# Patient Record
(demographics unavailable — no encounter records)

---

## 2024-10-14 NOTE — ASSESSMENT
[FreeTextEntry1] : Ms. BRAND is 44y F with medically refractory epilepsy previously treated on multiple first line AED, now on levetiracetam and lamotrigine and endorsing improved irritability and depression since stopping oxcarbazepine and lowering levetiracetam dose.  Ms. BRAND feels that seizure control has been significantly improved on ER formulations. Now s/p RNS implantation with bilateral mesial temporal leads.    RNS R lead problem d/w [employer] who recommend zeroing out/turning off R lead stim to preserve battery. L lead stim increased from 1.2 mA->1.7, no change to detection set.   Plan 1. RTC 1/13/25 for next RNS Clinic 2. hold Xcopri for now - Ms. BRAND may be willing to re-try, but will re-address at next visit.  3. RNS parameters reviewed with [employer] tech and RNS reprogrammed to increase L stim 1.5 mA. R stim is off.  4. Will let Dr Kruger know of RNS lead break and need to replace electrode.   I have spent 40  minutes or longer reviewing patient data or discussing with the patient  the cause of seizures or seizure-like events and comorbid conditions, assessing the risk of recurrence, educating the patient or family to recognize seizures, discussing possible treatment options for seizures and comorbid conditions and documenting encounter and plan. More than 50% of time spent counseling and educating patient about epilepsy including safety issues, AED side effects and interactions, alcohol consumption, sleep deprivation, risks and driving privileges associated with the [facility] [place]. Greater than 50% of the encounter time was spent on counseling and coordination of care for reviewing records in [employer], discussion with patient regarding plan.   ******* PROCEDURE: Analysis and Programming of [employer] RNS System SERVICE DATE: 10/14/2024   INTERROGATION:  Battery voltage is NOMINAL. Lead impedances are ABNORMAL on right, nominal on left.  Interval data recorded since last upload was transferred to [employer] cloud.   PROGRAMMING CHANGES Based on review of ECOG data, stimulation intensity was INCREASED to 1.7 ma on R leads and DECREASED to zero on L leads. Based on review of ECOG data, seizure detection parameters were NOT updated. Several test stimulations with these parameters were applied manually and were well tolerated.  Device interrogation was repeated at the end of the procedure to verify proper programming of all detections and stimulation settings.   IMPRESSION/PLAN/FOLLOW-UP Patient tolerated programmed changes well.   We will plan for next device interrogation in about 3 months. Will ask Dr. Kruger to evaluate probable lead break on R electrode.   TIME 30 minutes were spent on planning, reviewing, implementing and validating device programming changes.

## 2024-10-14 NOTE — HISTORY OF PRESENT ILLNESS
[FreeTextEntry1] : PCP - Dr. Abner Rodriguez -- [phone]; [address] [place] [address]; [place] [id number]  *** 10/14/2024  ***  Ms. BRAND reports that she is having 2-3 seizures per month - better than before RNS - she estimates that frequency is decreased by 75%.  Ms. BRAND briefly tried cenobamate - but over course of 2 weeks she felt menses were heavier than usual and dc'd it.  She is willing to revise RNS lead on R that is not working and generating high impedances.  Review of data indicate that she may also benefit from increasing stim intensity on left lead.   *** 2024  ***  Ms. BRAND is having 3-4 seizures per month. Review of PDMS shows 3 sz/mo from left, and 1 sz/mo from right (but leads on R are only working intermittently)  Ms. BRAND also c/o bump on left that is uncomfortable. She is apprehensive about trying cenobamate and did not start at last visit.  She has had difficulty getting f/u appointments with me.   *** 2023  *** Ms. BRAND reports baseline seizure rate.  1-2 per month.  Right RNS lead is not working she is reluctant to have it replaced.  She is receptive to trying cenobamate.   *** 2022  *** Ms. BRAND returns for f/u - she came 1 day before RNS clinic due to concerns for traffic at time of appt she was given.  Interrogation of RNS device shows following seizure history:  seizures - usually start L -> spread R - occurring , , , , 8/, 8, , 9/24x2, 10/17.  Seizure on 10/17 was stronger - led to loss awareness.  With most seizures, awareness is partially preserved. Prior to RNS seizures were 5-6 x per month, and more often GTC.   RNS interrogation shows that R electrode not recording ECOG and device is giving high impedance error.   *** 2022  *** Ms. BRAND has experience 1 seizure/month in Aug, Sep, Oct, 2 seizures in Nov, 3 in Dec, 1 in , 1 in Feb.  All seizures have been complex partial - no convulsion for > 1yr. Ms. BRAND endorses that she has been inconsistent uploading data from RNS due to hectic home life with son applying to college.  She briefly started Xcopri last summer, but discontinued out of concern it was causing hypertension.  No new problems. PDMS data reviewed.  ***UPDATE:2021*** MS Opal rBand is here today for a scheduled follow up office visit and is accompanied by her . May 26th had a seizure , No seizures in  , Three  seizures on , two seizures on  , and one event on .  Review of PDMS shows that onset was not captured for seizure of .  In July, seizures on  and  all began and finished on R.  On , seizure began on right, which triggered secondary seizure on L.  Detection did not occur for L seizure.  Prior events showed L side independent onset in Feb, with late detection.   Patient admitted to having some stress related to husbands upcoming surgery.  Ms. BRAND's spouse endorsed that she has increased irritabilty/mood swings.    Review of PDMS recordingds who that there is increased interictal activity overnight until between 10p and 7 am, with smaller relative increase in the mid-afternoon. No magnet activations noted  Collected ECOG samples today  Amitryptilline 25 mg daily caused major fatigue so she stopped Still uses OTC i.e Tylenol  Lamotrigine ER 200mg BID Levetiracetam ER 500mg  2 tabs po BID  *** 2021  *** Ms. BRAND had cpsz on  8:28a -  estimates that seizure may have been 12-15min and afterwards very postictal - while sitting at table - approximately 2wks after COVID19 vaccine- may have had 2 additional seizures.  No fever or persistent arm soreness.  2:20p - c/o dizziness and headache followed by seizure 30 min later. Ms. BRAND reports feeling of aura prior to seizure.  Seizure meds were taken at same time.  Often will be up to 1a or 2a, usually will be up at 6:30a. Feels sleepy during day. Goes to bed around 11p but can't get to sleep until 1 or 2a.  Ms. BRAND endorsed that since seizure, memory has been worse.  Ms. BRAND endorses that she is getting daily headaches - but has not been using amitriptyline that was prescribed at last visit.   2a - levetiracetam ER 1000 mg 7a - lamotrigine 200 mg  4p - levetiracetam ER 1000 mg  7p - lamotrigine 200 mg   ***UPDATE: 2021*** Ms Opal Brand is here today for a scheduled follow up office visit. She is accompanied by her  She is here today for RNS programming.  Eddie Mclain- RNS Tech  is instructing remotely. Neurostimulator interrogated and data retrieved.  She does not report any overt seizures since last office visit in January. She complains of headaches  nearly every day over last few weeks most days takes. Ms. BRAND is having frequent headaches daily. She takes Tylenol sometimes twice daily. Prior to a few weeks ago she reports taking Tylenol once or twice a week. Ms. BRAND also reports difficulty falling asleep and typically may stay awake until 3 am.  She reports being easily frustrated since the beginning of the year. No apparent trigger "Just a rough year in general".   Lamotrigine ER 200mg BID Levetiracetam ER 500mg tabs - 2 tabs po BID  *** 01/15/2021  *** Ms. Brand presents for scheduled follow-up.  Data from her implanted RNS is interrogated.  She had seizures twice on , and again on , in all cases from the right hippocampal leads.  Ms. Brand was aware of the seizures, and contacted the RNS technician the morning after.  All seizures occurred overnight, while she was asleep.  Ms. Brand reports no adverse effects of medications or other problems.  *** 10/20/2020  *** Ms. BRAND reports no interval seizures. She did have one event around , but after reporting event it was determined that there was not However, she is c/o headaches over R parietal region - described as stinging or buzzing sensation lasting 2-3 h and relieved by Tylenol occurring 2-3x per week. no triggers.  Ms. BRAND also reports that she is having more insomnia.    *** 2020  *** Ms. BRAND returns for follow-up and programming of RNS device. Review of saved data shows that  Ms. BRAND had focal seizure at the beginning of August. Ms. BRAND is unaware of any seizures, but  recalls that there may have been possible seizure around the time of detection.  No other problems.   *** 2020  *** -Appointment was conducted by video-conference in place of office appointment due to due to heightened concern for coronavirus infection risk. -Video Platform: [employer]  -Physician location: home -Patient location: home - [address] [place] [address]  -Individuals on call: CHACHA Chambers, spouse   Ms. BRAND had RNS .  RNS is currently only recording, not stimulating. Pain is not a problem.  She reports that since Hillary 15 she had 3 complex-partial seizures and one GTC. She gets aura of feeling dizzy.  Ms. BRAND had mood problems at higher doses of levetiracetam. She had labs drawn at [facility].   *** 06/15/2020  *** -Appointment was conducted by video-conference in place of office appointment due to due to heightened concern for coronavirus infection risk. -Verbal consent given on Rei 15, 2020 at 10:32 by the patient CHACHA BRAND ( May 30 1976) who understands that tele-visit will be charged to insurance and may involve co-pay for patient. -Video Platform: [employer]   -Physician location: home -Patient location: home - [address] [place] [address]  -Individuals on call: CHACHA Chambers  Ms. BRAND report no interval seizures since last appointment. She had visit with PCP and AED level requests were added to recently drawn labs. No results yet.  Ms. BRAND is taking meds at same doses, reports no problems. She has OR date with Dr. Kruger for end of July.   Ms. BRAND wanted July OR date because her  had been having problem with cardiac arrhythmia, and is getting evaluation currently.    *** 2020  *** -Appointment was conducted by video-conference in place of office appointment due to due to heightened concern for coronavirus infection risk. -Verbal consent given on May 19, 2020 at 16:33 by the patient CHACHA BRAND ( May 30 1976) who understands that tele-visit will be charged to insurance and may involve co-pay for patient. -Video Platform: [employer]   -Physician location: home -Patient location: home - [address] [place] [address]  -Individuals on call: Dr. Blunt, CHACHA BRAND, spouse, mother  Ms. BRAND had "absence" seizure last week, very confused after the seizure. She is getting a complex partial seizure every other week - 3 since last visit.  Ms. BRAND notices that once a wek she feel "stinging headache" on the right parietal region.  No medication side effects reported.  Ms. BRAND had routine labs sent by PCP last week, levels not checked as far as she knows.   *** 2020  *** Ms. BRAND returns for scheduled follow-up.  She is off oxcarbazepine that produced anxiety as side effect.  Now on lamotrigine 150 q12 and levetiracetam 1000 q12. Ms. BRAND had a seizure just before morning awakening last week.  thinks she was confused, wandered about house, disrobed, but did not have convulsion. Ms. BRAND and  also report separate staring spell that occurred during daytime. Ms. BRAND is tolerating lamotrigine and feels better off oxcarbazepine. She continues on levetiracetam as well.   *** 2020 *** CHACHA BRAND  is here for follow up. no seizures on  BID, Keppra 1000 BID Trileptal 100 daily Improved mood and no seizures recently. She is awaiting RNS approval. *** 2020 ***  CHACHA BRAND IS here for follow up. She did not have any seizures but she continue to feel very anxious w/ Trileptal addition and she is complaining about hyperphagia  She would prefer to go back on Lamictal and I do agree with her. *** 2020 *** Ms. BRAND is here for follow up due to possible medication side effect. She is taking Keppra 1500 BID XR despite the fact that I recommended decreasing it to 1000 BID. She is taking  BID. Usually she will have HA 15-20 min after OXC (but she takes Keppra 1 hour before OXC) Had a short seizure last day w/ impaired awareness *** 2020  *** Ms. BRAND returns for follow-up. She had intracranial study that documented independent seizures from left and right temporal lobes. Two left sided seizures impaired awareness. Three right sided seizure were associated with aura but no impaired awareness. One keft sided seizure had onset in left basal posterior temporal lobe, the other in left hippocampus. On the right, all seizures began in amygdala-hippocampus.  Ms. BRAND was discharged last week. [facility] Conference recommended RNS with bilateral hippocampal leads.   *** 10/01/2019  *** Ms. BRAND returns for follow-up. She underwent EMU monitoring last spring and found to have 2 seizures with EEG onset L temporal and 1 seizure with R temporal.  After last visit, levetiracetam and lamotrigine doses decreased and oxcarbazepine 600 added. Now sees improvement in seizure frequency but still has a few seizures per month. Mood is better, less irritable on lower dose of levetiracetam.  She inadvertently took lamotrigine 150mg (instead of 75mg) and felt some side effects - suggesting   *** 2019  *** Ms. BRAND has been having seizures for past 10 years (onset in childhood, then resolved). Usually occurs in sleep, but now aware that daytime staring spells are seizures.  Nocuturnal seizure are convulsions, usually clustered - up to 6 in 1 day - then postictally confused, will be incontinent of urine. Usually returns to baseline after 10 min. Will usually have post-ictal migraine.  During daytime spells - will be "spaced out, will not respond"  when she comes back she'll ask "what happened." Seizures may last 1-2 minutes, will be postictally confused for 5-10 minutes.  No incontinence with daytime seizures.  Will get headache a few hours before nocturnal seizures. No aura before daytime seizures. Does not drop items during staring spells, does not fall. These staring spells began about 6 years ago. First event recalled was at Grandfather's  when she appeared spaced out. Daytime spells - may occur 0-2 per month - could be more as may occur without witness. Has not had burns.  She has had occasions of finding herself in a place without recall how she got there. Ms. BRAND also endorses that her memory is bad - does not recall things that were told to her.   *** from initial visit with Dr. Lopez *** This is a 38 year old right handed woman with pmh lower back pain s/p injury presenting for care of seizures. Seizures first started at age 8-9. No seizures since then until . Patient was sleeping then started shaking lasted about 5 minutes. No tongue bit or urinary incontinence. Went to [facility]. Has about 3 more seizures and was started on medications after the 3rd time. vEEG done at [facility] (seen by Dr. Sands) with normal findings. Events not captured during vEEG. vEEG done 2 weeks ago with seizures captured. Was told that she has left temporal complex partial seizures. Last seizure was this past friday in the daytime. Has had seizures on new medication regimen. Dose increase in Aptiom was prior to last seizure. Continued to have seizures on Dilantin in the past.   Last MRI was 2014, white matter changed only  Meds: Lamictal 100/100, Aptiom 800 All: NKDA SH: on workers comp, no smoke, no etoh, no drug use FH: no seizures  Handedness: The patient is right handed.  Age of Onset: 8.  Seizure / Event Types: does not recall, possible nehavioral arrest, feels tired afterward.  Epilepsy Risk Factors: no  complications, no febrile seizures, no developmental delay, no head trauma, no meningitis or encephalitis, no stroke  and no family history of seizures  Past treatment has included lamotrigine (Lamictal) and phenytoin (Dilantin).

## 2024-10-18 NOTE — HISTORY OF PRESENT ILLNESS
[FreeTextEntry1] : The patient is a 44-year-old female with a history of intractable seizures refractory to multiple medications.  She had undergone prior  stereo EEG evaluation with onsets coming equally bilaterally from the right hippocampus and the left hippocampus, as well as some areas of the surrounding hippocampal region on the left. She then underwent implantation of RNS on 7/29/20.  She has been following up with Dr. Blunt. Currently she reports having seizures 2-3 times per month which is an improvement compared to before RNS. RNS leads on the right are not working and recording. it is generating high impedances. She is here to discuss RNS revision

## 2024-10-18 NOTE — PHYSICAL EXAM
[General Appearance - Alert] : alert [General Appearance - In No Acute Distress] : in no acute distress [General Appearance - Well Nourished] : well nourished [General Appearance - Well Developed] : well developed [Oriented To Time, Place, And Person] : oriented to person, place, and time [Impaired Insight] : insight and judgment were intact [Affect] : the affect was normal [Mood] : the mood was normal [Motor Tone] : muscle tone was normal in all four extremities [Motor Strength] : muscle strength was normal in all four extremities [Involuntary Movements] : no involuntary movements were seen [No Muscle Atrophy] : normal bulk in all four extremities [Sclera] : the sclera and conjunctiva were normal [Outer Ear] : the ears and nose were normal in appearance [Hearing Threshold Finger Rub Not Cooke] : hearing was normal [Neck Appearance] : the appearance of the neck was normal [Neck Cervical Mass (___cm)] : no neck mass was observed [Exaggerated Use Of Accessory Muscles For Inspiration] : no accessory muscle use [Heart Rate And Rhythm] : heart rate was normal and rhythm regular [Edema] : there was no peripheral edema [Abdomen Soft] : soft [No Spinal Tenderness] : no spinal tenderness [Abnormal Walk] : normal gait [Skin Color & Pigmentation] : normal skin color and pigmentation [] : no rash

## 2024-10-18 NOTE — PHYSICAL EXAM
[General Appearance - Alert] : alert [General Appearance - In No Acute Distress] : in no acute distress [General Appearance - Well Nourished] : well nourished [General Appearance - Well Developed] : well developed [Oriented To Time, Place, And Person] : oriented to person, place, and time [Impaired Insight] : insight and judgment were intact [Affect] : the affect was normal [Mood] : the mood was normal [Motor Tone] : muscle tone was normal in all four extremities [Motor Strength] : muscle strength was normal in all four extremities [Involuntary Movements] : no involuntary movements were seen [No Muscle Atrophy] : normal bulk in all four extremities [Sclera] : the sclera and conjunctiva were normal [Outer Ear] : the ears and nose were normal in appearance [Hearing Threshold Finger Rub Not Desha] : hearing was normal [Neck Appearance] : the appearance of the neck was normal [Neck Cervical Mass (___cm)] : no neck mass was observed [Exaggerated Use Of Accessory Muscles For Inspiration] : no accessory muscle use [Heart Rate And Rhythm] : heart rate was normal and rhythm regular [Edema] : there was no peripheral edema [Abdomen Soft] : soft [No Spinal Tenderness] : no spinal tenderness [Abnormal Walk] : normal gait [Skin Color & Pigmentation] : normal skin color and pigmentation [] : no rash

## 2024-10-18 NOTE — ASSESSMENT
[FreeTextEntry1] : 48 years old woman with poorly controlled seizures refractory to multiple medications. She previously underwent stereoEEG and was noted with seizure onsets coming from bilateral right and left hippocampus. She then underwent implantation of responsive neurostimulator on 7/29/20.   She has been following up with Dr. Blunt. Currently she reports having seizures 2-3 times per month which is an improvement compared to before RNS. RNS leads on the right are not working and recording. it is generating high impedances.   Plan: - RNS revision  - Surgical intervention discussed in great details with benefits and risks including but not limited to infection, CSF leak, failure to relieve symptoms among others. Risks and benefits as well as alternatives to the proposed treatment were also provided to the patient and family. They were given the opportunity to have all their questions answered to their satisfaction. The patient would like to proceed as planned.